# Patient Record
Sex: FEMALE | Race: BLACK OR AFRICAN AMERICAN | NOT HISPANIC OR LATINO | Employment: FULL TIME | URBAN - METROPOLITAN AREA
[De-identification: names, ages, dates, MRNs, and addresses within clinical notes are randomized per-mention and may not be internally consistent; named-entity substitution may affect disease eponyms.]

---

## 2018-01-27 ENCOUNTER — APPOINTMENT (OUTPATIENT)
Dept: CT IMAGING | Facility: HOSPITAL | Age: 53
End: 2018-01-27

## 2018-01-27 ENCOUNTER — HOSPITAL ENCOUNTER (EMERGENCY)
Facility: HOSPITAL | Age: 53
Discharge: HOME | End: 2018-01-27
Attending: EMERGENCY MEDICINE | Admitting: EMERGENCY MEDICINE

## 2018-01-27 ENCOUNTER — APPOINTMENT (OUTPATIENT)
Dept: GENERAL RADIOLOGY | Facility: HOSPITAL | Age: 53
End: 2018-01-27

## 2018-01-27 VITALS
HEART RATE: 65 BPM | HEIGHT: 65 IN | OXYGEN SATURATION: 100 % | WEIGHT: 110 LBS | RESPIRATION RATE: 18 BRPM | TEMPERATURE: 98.4 F | DIASTOLIC BLOOD PRESSURE: 87 MMHG | SYSTOLIC BLOOD PRESSURE: 154 MMHG | BODY MASS INDEX: 18.33 KG/M2

## 2018-01-27 DIAGNOSIS — R51.9 NONINTRACTABLE HEADACHE, UNSPECIFIED CHRONICITY PATTERN, UNSPECIFIED HEADACHE TYPE: Primary | ICD-10-CM

## 2018-01-27 DIAGNOSIS — R42 DIZZINESS: ICD-10-CM

## 2018-01-27 LAB
ALBUMIN SERPL-MCNC: 4.5 G/DL (ref 3.5–5.2)
ALBUMIN/GLOB SERPL: 1.3 G/DL
ALP SERPL-CCNC: 123 U/L (ref 39–117)
ALT SERPL W P-5'-P-CCNC: 9 U/L (ref 1–33)
ANION GAP SERPL CALCULATED.3IONS-SCNC: 11.3 MMOL/L
AST SERPL-CCNC: 25 U/L (ref 1–32)
BASOPHILS # BLD AUTO: 0.01 10*3/MM3 (ref 0–0.2)
BASOPHILS NFR BLD AUTO: 0.2 % (ref 0–1.5)
BILIRUB SERPL-MCNC: 0.4 MG/DL (ref 0.1–1.2)
BUN BLD-MCNC: 4 MG/DL (ref 6–20)
BUN/CREAT SERPL: 5 (ref 7–25)
CALCIUM SPEC-SCNC: 9.3 MG/DL (ref 8.6–10.5)
CHLORIDE SERPL-SCNC: 93 MMOL/L (ref 98–107)
CO2 SERPL-SCNC: 34.7 MMOL/L (ref 22–29)
CREAT BLD-MCNC: 0.8 MG/DL (ref 0.57–1)
DEPRECATED RDW RBC AUTO: 43.6 FL (ref 37–54)
EOSINOPHIL # BLD AUTO: 0.06 10*3/MM3 (ref 0–0.7)
EOSINOPHIL NFR BLD AUTO: 1.1 % (ref 0.3–6.2)
ERYTHROCYTE [DISTWIDTH] IN BLOOD BY AUTOMATED COUNT: 12.9 % (ref 11.7–13)
GFR SERPL CREATININE-BSD FRML MDRD: 91 ML/MIN/1.73
GLOBULIN UR ELPH-MCNC: 3.5 GM/DL
GLUCOSE BLD-MCNC: 99 MG/DL (ref 65–99)
HCG SERPL QL: NEGATIVE
HCT VFR BLD AUTO: 39.4 % (ref 35.6–45.5)
HGB BLD-MCNC: 12.4 G/DL (ref 11.9–15.5)
HOLD SPECIMEN: NORMAL
IMM GRANULOCYTES # BLD: 0 10*3/MM3 (ref 0–0.03)
IMM GRANULOCYTES NFR BLD: 0 % (ref 0–0.5)
LYMPHOCYTES # BLD AUTO: 2.11 10*3/MM3 (ref 0.9–4.8)
LYMPHOCYTES NFR BLD AUTO: 37.9 % (ref 19.6–45.3)
MCH RBC QN AUTO: 29 PG (ref 26.9–32)
MCHC RBC AUTO-ENTMCNC: 31.5 G/DL (ref 32.4–36.3)
MCV RBC AUTO: 92.3 FL (ref 80.5–98.2)
MONOCYTES # BLD AUTO: 0.5 10*3/MM3 (ref 0.2–1.2)
MONOCYTES NFR BLD AUTO: 9 % (ref 5–12)
NEUTROPHILS # BLD AUTO: 2.89 10*3/MM3 (ref 1.9–8.1)
NEUTROPHILS NFR BLD AUTO: 51.8 % (ref 42.7–76)
PLATELET # BLD AUTO: 201 10*3/MM3 (ref 140–500)
PMV BLD AUTO: 9.9 FL (ref 6–12)
POTASSIUM BLD-SCNC: 3.1 MMOL/L (ref 3.5–5.2)
PROT SERPL-MCNC: 8 G/DL (ref 6–8.5)
RBC # BLD AUTO: 4.27 10*6/MM3 (ref 3.9–5.2)
SODIUM BLD-SCNC: 139 MMOL/L (ref 136–145)
TROPONIN T SERPL-MCNC: <0.01 NG/ML (ref 0–0.03)
WBC NRBC COR # BLD: 5.57 10*3/MM3 (ref 4.5–10.7)
WHOLE BLOOD HOLD SPECIMEN: NORMAL
WHOLE BLOOD HOLD SPECIMEN: NORMAL

## 2018-01-27 PROCEDURE — 85025 COMPLETE CBC W/AUTO DIFF WBC: CPT | Performed by: EMERGENCY MEDICINE

## 2018-01-27 PROCEDURE — 25010000002 KETOROLAC TROMETHAMINE PER 15 MG: Performed by: PHYSICIAN ASSISTANT

## 2018-01-27 PROCEDURE — 80053 COMPREHEN METABOLIC PANEL: CPT | Performed by: EMERGENCY MEDICINE

## 2018-01-27 PROCEDURE — 96374 THER/PROPH/DIAG INJ IV PUSH: CPT

## 2018-01-27 PROCEDURE — 84703 CHORIONIC GONADOTROPIN ASSAY: CPT | Performed by: EMERGENCY MEDICINE

## 2018-01-27 PROCEDURE — 99284 EMERGENCY DEPT VISIT MOD MDM: CPT

## 2018-01-27 PROCEDURE — 70450 CT HEAD/BRAIN W/O DYE: CPT

## 2018-01-27 PROCEDURE — 84484 ASSAY OF TROPONIN QUANT: CPT | Performed by: EMERGENCY MEDICINE

## 2018-01-27 PROCEDURE — 71046 X-RAY EXAM CHEST 2 VIEWS: CPT

## 2018-01-27 PROCEDURE — 93005 ELECTROCARDIOGRAM TRACING: CPT

## 2018-01-27 PROCEDURE — 93010 ELECTROCARDIOGRAM REPORT: CPT | Performed by: INTERNAL MEDICINE

## 2018-01-27 PROCEDURE — 96361 HYDRATE IV INFUSION ADD-ON: CPT

## 2018-01-27 RX ORDER — SODIUM CHLORIDE 0.9 % (FLUSH) 0.9 %
10 SYRINGE (ML) INJECTION AS NEEDED
Status: DISCONTINUED | OUTPATIENT
Start: 2018-01-27 | End: 2018-01-27 | Stop reason: HOSPADM

## 2018-01-27 RX ORDER — KETOROLAC TROMETHAMINE 30 MG/ML
15 INJECTION, SOLUTION INTRAMUSCULAR; INTRAVENOUS ONCE
Status: COMPLETED | OUTPATIENT
Start: 2018-01-27 | End: 2018-01-27

## 2018-01-27 RX ORDER — ONDANSETRON 2 MG/ML
4 INJECTION INTRAMUSCULAR; INTRAVENOUS ONCE
Status: DISCONTINUED | OUTPATIENT
Start: 2018-01-27 | End: 2018-01-27

## 2018-01-27 RX ORDER — ASPIRIN 325 MG
325 TABLET ORAL ONCE
Status: DISCONTINUED | OUTPATIENT
Start: 2018-01-27 | End: 2018-01-27

## 2018-01-27 RX ORDER — POTASSIUM CHLORIDE 750 MG/1
40 CAPSULE, EXTENDED RELEASE ORAL ONCE
Status: DISCONTINUED | OUTPATIENT
Start: 2018-01-27 | End: 2018-01-27

## 2018-01-27 RX ORDER — MECLIZINE HYDROCHLORIDE 25 MG/1
25 TABLET ORAL EVERY 6 HOURS PRN
Qty: 20 TABLET | Refills: 0 | Status: SHIPPED | OUTPATIENT
Start: 2018-01-27

## 2018-01-27 RX ORDER — MECLIZINE HYDROCHLORIDE 25 MG/1
25 TABLET ORAL ONCE
Status: COMPLETED | OUTPATIENT
Start: 2018-01-27 | End: 2018-01-27

## 2018-01-27 RX ORDER — POTASSIUM CHLORIDE 1.5 G/1.77G
40 POWDER, FOR SOLUTION ORAL ONCE
Status: COMPLETED | OUTPATIENT
Start: 2018-01-27 | End: 2018-01-27

## 2018-01-27 RX ADMIN — SODIUM CHLORIDE 1000 ML: 9 INJECTION, SOLUTION INTRAVENOUS at 20:01

## 2018-01-27 RX ADMIN — KETOROLAC TROMETHAMINE 15 MG: 30 INJECTION, SOLUTION INTRAMUSCULAR at 20:08

## 2018-01-27 RX ADMIN — MECLIZINE 25 MG: 25 TABLET ORAL at 20:11

## 2018-01-27 RX ADMIN — POTASSIUM CHLORIDE 40 MEQ: 1.5 POWDER, FOR SOLUTION ORAL at 20:23

## 2018-01-27 NOTE — ED NOTES
Pt reports dizziness, chest pressure, and nausea intermittently over the past few days. She feels that its getting worse today. She was sent here from [facility] after having EKG done. Reassurance given; call light in reach. Pts breathing even and unlabored. Pt appears in NAD at this time.      Toña Ocampo RN  01/27/18 0724

## 2018-01-27 NOTE — ED PROVIDER NOTES
" EMERGENCY DEPARTMENT ENCOUNTER    CHIEF COMPLAINT  Chief Complaint: Dizziness  History given by: Pt  History limited by: Nothing  Room Number: 03/03  PMD: Miriam Mercado Reyes, MD      HPI:  Pt is a 52 y.o. female who presents complaining of intermittent dizziness (off balance , spinning sensation) that began three days ago.  Pt reports associated nausea and headache that began a few days ago.  Pt states that the symptoms occur intermittently and together.  Episodes occurred upon standing and also while seated. Pt reports swelling in her legs and ankles in the evenings. Pt reports associated SOB and chest pressure.  Pt states that she was taken off of amlodipine several months ago and had sustained a normal blood pressure for an extend amount of time.  However, her blood pressure was high when she had her last checkup.     Duration:  Three days  Onset: Sudden  Timing: Intermittent  Intensity/Severity: Moderate  Quality:  \"Off balance, spinning\"  Progression: Unchanged  Associated Symptoms:  Pt reports associated nausea and headache that began a few days ago.  Pt states that the symptoms occur intermittently and together.   Aggravating Factors: None.  Alleviating Factors: None  Previous Episodes: None  Treatment before arrival: None    PAST MEDICAL HISTORY  Active Ambulatory Problems     Diagnosis Date Noted   • No Active Ambulatory Problems     Resolved Ambulatory Problems     Diagnosis Date Noted   • No Resolved Ambulatory Problems     Past Medical History:   Diagnosis Date   • Cancer        PAST SURGICAL HISTORY  History reviewed. No pertinent surgical history.    FAMILY HISTORY  History reviewed. No pertinent family history.    SOCIAL HISTORY  Social History     Social History   • Marital status: Single     Spouse name: N/A   • Number of children: N/A   • Years of education: N/A     Occupational History   • Not on file.     Social History Main Topics   • Smoking status: Former Smoker     Types: Cigarettes     " Quit date: 10/27/2017   • Smokeless tobacco: Never Used   • Alcohol use No   • Drug use: Not on file   • Sexual activity: Not on file     Other Topics Concern   • Not on file     Social History Narrative   • No narrative on file       ALLERGIES  Demerol [meperidine]    REVIEW OF SYSTEMS  Review of Systems   Constitutional: Negative for fever.   HENT: Negative for sore throat.    Eyes: Negative.    Respiratory: Positive for shortness of breath. Negative for cough.    Cardiovascular: Positive for leg swelling. Negative for chest pain.   Gastrointestinal: Positive for nausea. Negative for abdominal pain, diarrhea and vomiting.   Genitourinary: Negative for dysuria.   Musculoskeletal: Negative for neck pain.   Skin: Negative for rash.   Allergic/Immunologic: Negative.    Neurological: Positive for dizziness and headaches. Negative for weakness and numbness.   Hematological: Negative.    Psychiatric/Behavioral: Negative.    All other systems reviewed and are negative.      PHYSICAL EXAM  ED Triage Vitals   Temp Heart Rate Resp BP SpO2   01/27/18 1705 01/27/18 1705 01/27/18 1705 01/27/18 1724 01/27/18 1705   98.3 °F (36.8 °C) 88 18 156/103 100 %      Temp src Heart Rate Source Patient Position BP Location FiO2 (%)   01/27/18 1705 01/27/18 1705 -- -- --   Tympanic Monitor          Physical Exam   Constitutional: She is oriented to person, place, and time and well-developed, well-nourished, and in no distress. No distress.   HENT:   Head: Normocephalic and atraumatic.   Mouth/Throat: Oropharynx is clear and moist.   Eyes: Pupils are equal, round, and reactive to light.   A few beats of normal horizontal nystagmus.   Neck: Normal range of motion. Neck supple.   Cardiovascular: Normal rate, regular rhythm and normal heart sounds.    Pulmonary/Chest: Effort normal and breath sounds normal. No respiratory distress.   Abdominal: Soft. There is no tenderness. There is no rebound and no guarding.   Musculoskeletal: Normal range of  motion. She exhibits no edema.   Neurological: She is alert and oriented to person, place, and time. She has normal sensation and normal strength.   Skin: Skin is warm and dry. No rash noted.   Psychiatric: Mood and affect normal.   Nursing note and vitals reviewed.      LAB RESULTS  Lab Results (last 24 hours)     Procedure Component Value Units Date/Time    CBC & Differential [[id number]] Collected:  01/27/18 1732    Specimen:  Blood Updated:  01/27/18 1823    Narrative:       The following orders were created for panel order CBC & Differential.  Procedure                               Abnormality         Status                     ---------                               -----------         ------                     CBC Auto Differential[[id number]]        Abnormal            Final result                 Please view results for these tests on the individual orders.    Comprehensive Metabolic Panel [[id number]]  (Abnormal) Collected:  01/27/18 1732    Specimen:  Blood Updated:  01/27/18 1828     Glucose 99 mg/dL      BUN 4 (L) mg/dL      Creatinine 0.80 mg/dL      Sodium 139 mmol/L      Potassium 3.1 (L) mmol/L      Chloride 93 (L) mmol/L      CO2 34.7 (H) mmol/L      Calcium 9.3 mg/dL      Total Protein 8.0 g/dL      Albumin 4.50 g/dL      ALT (SGPT) 9 U/L      AST (SGOT) 25 U/L      Alkaline Phosphatase 123 (H) U/L      Total Bilirubin 0.4 mg/dL      eGFR  African Amer 91 mL/min/1.73      Globulin 3.5 gm/dL      A/G Ratio 1.3 g/dL      BUN/Creatinine Ratio 5.0 (L)     Anion Gap 11.3 mmol/L     Troponin [[id number]]  (Normal) Collected:  01/27/18 1732    Specimen:  Blood Updated:  01/27/18 1828     Troponin T <0.010 ng/mL     Narrative:       Troponin T Reference Ranges:  Less than 0.03 ng/mL:    Negative for AMI  0.03 to 0.09 ng/mL:      Indeterminant for AMI  Greater than 0.09 ng/mL: Positive for AMI    hCG, Serum, Qualitative [[id number]]  (Normal) Collected:  01/27/18 1732    Specimen:  Blood Updated:  01/27/18  1850     HCG Qualitative Negative    CBC Auto Differential [[id number]]  (Abnormal) Collected:  01/27/18 1732    Specimen:  Blood Updated:  01/27/18 1823     WBC 5.57 10*3/mm3      RBC 4.27 10*6/mm3      Hemoglobin 12.4 g/dL      Hematocrit 39.4 %      MCV 92.3 fL      MCH 29.0 pg      MCHC 31.5 (L) g/dL      RDW 12.9 %      RDW-SD 43.6 fl      MPV 9.9 fL      Platelets 201 10*3/mm3      Neutrophil % 51.8 %      Lymphocyte % 37.9 %      Monocyte % 9.0 %      Eosinophil % 1.1 %      Basophil % 0.2 %      Immature Grans % 0.0 %      Neutrophils, Absolute 2.89 10*3/mm3      Lymphocytes, Absolute 2.11 10*3/mm3      Monocytes, Absolute 0.50 10*3/mm3      Eosinophils, Absolute 0.06 10*3/mm3      Basophils, Absolute 0.01 10*3/mm3      Immature Grans, Absolute 0.00 10*3/mm3           I ordered the above labs and reviewed the results    RADIOLOGY  CT Head Without Contrast   Final Result   1. No acute intracranial abnormality.                           This study was performed with techniques to keep radiation doses as low   as reasonably achievable (ALARA). Individualized dose reduction   techniques using automated exposure control or adjustment of mA and/or   kV according to the patient size were employed.        This report was finalized on 1/27/2018 7:56 PM by Bandar Zavala MD.          XR Chest 2 View   Preliminary Result   No acute process.               I ordered the above noted radiological studies. Interpreted by radiologist. Reviewed by me in PACS.       PROCEDURES  Procedures        PROGRESS AND CONSULTS  ED Course   Value Comment By Time   RDW: 12.9 (Reviewed) Elizabeth Espinosa 01/27 2127     Medications   sodium chloride 0.9 % flush 10 mL (not administered)   sodium chloride 0.9 % bolus 1,000 mL (0 mL Intravenous Stopped 1/27/18 2129)   ketorolac (TORADOL) injection 15 mg (15 mg Intravenous Given 1/27/18 2008)   meclizine (ANTIVERT) tablet 25 mg (25 mg Oral Given 1/27/18 2011)   potassium chloride (KLOR-CON) packet  40 mEq (40 mEq Oral Given 1/27/18 2023)     1846  Ordered CT head and orthostatics for further evaluation.     2058  Potassium is low.  Troponin is negative.  Pt is not orthostatic.  CT scan shows nothing acute. Rechecked the patient and she is resting comfortably. Discussed pertinent lab and imaging results.. Discussed the plan to discharge. Patient agrees with plan and all questions were addressed. BP- 138/70 HR- 59 Temp- 98.3 °F (36.8 °C) (Tympanic) O2 sat- 98%      MEDICAL DECISION MAKING  Results were reviewed/discussed with the patient and they were also made aware of online access. Pt also made aware that some labs, such as cultures, will not be resulted during ER visit and follow up with PMD is necessary.     MDM  Number of Diagnoses or Management Options     Amount and/or Complexity of Data Reviewed  Clinical lab tests: reviewed (Potassium is low.  Troponin is negative. )  Tests in the radiology section of CPT®: reviewed and ordered (CT shows nothing acute.)  Tests in the medicine section of CPT®: reviewed (Pt is not orthostatic)    Patient Progress  Patient progress: stable         DIAGNOSIS  Final diagnoses:   Nonintractable headache, unspecified chronicity pattern, unspecified headache type   Dizziness       DISPOSITION  DISCHARGE    Patient discharged in stable condition.    Reviewed implications of results, diagnosis, meds, responsibility to follow up, warning signs and symptoms of possible worsening, potential complications and reasons to return to ER.    Patient/Family voiced understanding of above instructions.    Discussed plan for discharge, as there is no emergent indication for admission.  Pt/family is agreeable and understands need for follow up and repeat testing.  Pt is aware that discharge does not mean that nothing is wrong but it indicates no emergency is present that requires admission and they must continue care with follow-up as given below or physician of their choice.      FOLLOW-UP  Miriam Mercado Reyes, MD  [address] [facility] [address]  [phone]    Schedule an appointment as soon as possible for a visit in 3 days           Medication List      New Prescriptions          meclizine 25 MG tablet   Commonly known as:  ANTIVERT   Take 1 tablet by mouth Every 6 (Six) Hours As Needed for dizziness.               Latest Documented Vital Signs:  As of 9:49 PM  BP- 154/87 HR- 65 Temp- 98.4 °F (36.9 °C) (Oral) O2 sat- 100%    --  Documentation assistance provided by trice Espinosa for Jae Akers PAC.  Information recorded by the trice was done at my direction and has been verified and validated by me.            Elizabeth Espinosa  01/27/18 2127       SHAAN Flores  01/27/18 7392

## 2018-01-28 NOTE — DISCHARGE INSTRUCTIONS
Home, rest, home medicine as prescribed, medicine as needed for dizziness, tylenol or ibuprofen as needed for headache, follow up with PCP for recheck. Return to care with further concerns.

## 2018-01-28 NOTE — ED PROVIDER NOTES
Pt presents to the ED complaining of dizziness, headache, nausea, chest tightness, palpitations that began 3 days ago. Pt states lightheadedness and dizziness worsen on ambulation. Pt states she has had 2 episodes of increased dizziness that have forced her to sit down. Pt was seen at Urgent Care today and referred to ED due to abnormal result of EKG.    On exam, pt is AOx3, in NAD, PEARRL, moist mucous membranes, heart is RRR, lungs clear to ascultation bilaterally, soft, non tender abdomen with positive bowel sounds, and has a normal neuro exam.     EKG    EKG time: 1729  Rhythm/Rate: NSR 77  No Acute Ischemia  Non-Specific ST-T changes    Unchanged compared to prior at Urgent Care done at 1609 today.     Interpreted Contemporaneously by me.  Independently viewed by me      I agree with midlevel plan to assess labs and CT for further evaluation.     I supervised care provided by the midlevel provider.    We have discussed this patient's history, physical exam, and treatment plan.   I have reviewed the note and personally saw and examined the patient and agree with the plan of care.    Documentation assistance provided by trice Lyon for Dr. Pedraza.  Information recorded by the trice was done at my direction and has been verified and validated by me.       Marcia Lyon  01/27/18 1914       Marcia Lyon  01/27/18 1917       Nabil Pedraza MD  01/27/18 2053